# Patient Record
Sex: MALE | HISPANIC OR LATINO | URBAN - METROPOLITAN AREA
[De-identification: names, ages, dates, MRNs, and addresses within clinical notes are randomized per-mention and may not be internally consistent; named-entity substitution may affect disease eponyms.]

---

## 2019-07-15 ENCOUNTER — APPOINTMENT (RX ONLY)
Dept: URBAN - METROPOLITAN AREA CLINIC 371 | Facility: CLINIC | Age: 69
Setting detail: DERMATOLOGY
End: 2019-07-15

## 2019-07-15 DIAGNOSIS — L82.1 OTHER SEBORRHEIC KERATOSIS: ICD-10-CM

## 2019-07-15 DIAGNOSIS — L72.0 EPIDERMAL CYST: ICD-10-CM

## 2019-07-15 PROBLEM — E13.9 OTHER SPECIFIED DIABETES MELLITUS WITHOUT COMPLICATIONS: Status: ACTIVE | Noted: 2019-07-15

## 2019-07-15 PROCEDURE — ? COUNSELING

## 2019-07-15 PROCEDURE — 99202 OFFICE O/P NEW SF 15 MIN: CPT

## 2019-07-15 PROCEDURE — ? TREATMENT REGIMEN

## 2019-07-15 PROCEDURE — ? DEFER

## 2019-07-15 ASSESSMENT — LOCATION ZONE DERM
LOCATION ZONE: TRUNK
LOCATION ZONE: SCALP

## 2019-07-15 ASSESSMENT — LOCATION DETAILED DESCRIPTION DERM
LOCATION DETAILED: LEFT SUPERIOR UPPER BACK
LOCATION DETAILED: RIGHT MEDIAL FRONTAL SCALP

## 2019-07-15 ASSESSMENT — LOCATION SIMPLE DESCRIPTION DERM
LOCATION SIMPLE: LEFT UPPER BACK
LOCATION SIMPLE: RIGHT SCALP

## 2019-07-15 NOTE — PROCEDURE: DEFER
Introduction Text (Please End With A Colon): The following procedure was deferred:
Procedure To Be Performed At Next Visit: Treatment
Detail Level: Detailed
Instructions (Optional): Defer S3S
Instructions (Optional): Patient defers oral antibiotics at this time.
Procedure To Be Performed At Next Visit: Excision

## 2021-04-21 ENCOUNTER — OFFICE (OUTPATIENT)
Dept: URBAN - METROPOLITAN AREA CLINIC 102 | Facility: CLINIC | Age: 71
End: 2021-04-21
Payer: COMMERCIAL

## 2021-04-21 VITALS
WEIGHT: 223 LBS | TEMPERATURE: 97.3 F | HEART RATE: 81 BPM | SYSTOLIC BLOOD PRESSURE: 166 MMHG | DIASTOLIC BLOOD PRESSURE: 84 MMHG | HEIGHT: 68 IN

## 2021-04-21 DIAGNOSIS — R10.32 LEFT LOWER QUADRANT PAIN: ICD-10-CM

## 2021-04-21 DIAGNOSIS — K59.09 OTHER CONSTIPATION: ICD-10-CM

## 2021-04-21 DIAGNOSIS — R10.9 UNSPECIFIED ABDOMINAL PAIN: ICD-10-CM

## 2021-04-21 PROCEDURE — 99204 OFFICE O/P NEW MOD 45 MIN: CPT

## 2021-04-21 NOTE — SERVICEHPINOTES
ANDRES CLINE   is a   70   year old male who is being seen in consultation at the request of    for constipation , abd pain. He reports LLQ left flank pain x 7 days and constipation. Ended up going to ER last week due to pain and symptoms:BR4/16/21 CT A/P with IV contrast--diverticulosis without diverticulitis collapsed bladder with wall thickening and perivesical stranding, concerning for cystitis. BRHe was given Keflex x 1 week but has not picked up rx yet. No anemia or leukocytosis on labs.He reports ongoing pain. Has been taking duclolax, mag citrate, enema for constipation which give him diarrhea and "clean out" but has not had any improvement in pain with BMs. No fever, n/v. S/p partial colectomy for perforated diverticulitis in 1984 but has not had issues since. States he has been getting regular colonoscopies--his last colonoscopy was approx. 3 years ago in [place] and  told to return in 5-10 years. BMs typically 3 times per day. BSS type 4. Recently was BSS type 1-2. Denies melena or rectal bleeding. Pain is constant, no matter what. He was also given [place] at ER for pain but has not picked up, wants to avoid this. H/o bladder cancer in 2014 s/p chemo and radiation. Follows with urologist in [place], making an appt with him soon. No h/o adverse cardiac events.  BR